# Patient Record
Sex: FEMALE | Race: WHITE | NOT HISPANIC OR LATINO | Employment: OTHER | RURAL
[De-identification: names, ages, dates, MRNs, and addresses within clinical notes are randomized per-mention and may not be internally consistent; named-entity substitution may affect disease eponyms.]

---

## 2018-04-24 ENCOUNTER — OFFICE VISIT (OUTPATIENT)
Dept: CARDIOLOGY | Facility: CLINIC | Age: 78
End: 2018-04-24

## 2018-04-24 VITALS
WEIGHT: 164 LBS | HEIGHT: 65 IN | BODY MASS INDEX: 27.32 KG/M2 | HEART RATE: 72 BPM | SYSTOLIC BLOOD PRESSURE: 173 MMHG | DIASTOLIC BLOOD PRESSURE: 88 MMHG

## 2018-04-24 DIAGNOSIS — R07.2 PRECORDIAL PAIN: Primary | ICD-10-CM

## 2018-04-24 DIAGNOSIS — R03.0 ELEVATED BP WITHOUT DIAGNOSIS OF HYPERTENSION: ICD-10-CM

## 2018-04-24 DIAGNOSIS — E11.9 TYPE 2 DIABETES MELLITUS WITHOUT COMPLICATION, WITHOUT LONG-TERM CURRENT USE OF INSULIN (HCC): ICD-10-CM

## 2018-04-24 PROCEDURE — 93000 ELECTROCARDIOGRAM COMPLETE: CPT | Performed by: INTERNAL MEDICINE

## 2018-04-24 PROCEDURE — 99204 OFFICE O/P NEW MOD 45 MIN: CPT | Performed by: INTERNAL MEDICINE

## 2018-04-24 RX ORDER — POTASSIUM CHLORIDE 20 MEQ/1
20 TABLET, EXTENDED RELEASE ORAL 2 TIMES DAILY
COMMUNITY

## 2018-04-24 RX ORDER — FUROSEMIDE 20 MG/1
20 TABLET ORAL 2 TIMES DAILY
COMMUNITY

## 2018-04-24 NOTE — PROGRESS NOTES
"[facility] at [facility]  Consultation H&P  Camryn Bender  1940  [phone]    VISIT DATE:  18    PCP: JUAN Correa  [address] [address]    IDENTIFICATION: A 77 y.o. female. Madeleine Chandra's aunt    CC:  Chief Complaint   Patient presents with   • Chest Pain   • Edema       PROBLEM LIST:  1. CP  2. Lower extremity edema  3. OA  4. HTN  5. Hx DM   1. For 10+ years \"resolved\" following gastric bypass  6.   1. Twins stillbirth, nl pregnancies otherwise  7. Surgical history   1. Lasik eye surgery  2. Appendectomy  3. Hemorrhoid surgery  4. Hysterectomy  5. Bladder tack  6. Rotator cuff surgery R  7. Gastric bypass , [place], lost 57 pounds    Allergies  Allergies   Allergen Reactions   • Penicillins Hives       Current Medications    Current Outpatient Prescriptions:   •  furosemide (LASIX) 20 MG tablet, Take 20 mg by mouth 2 (Two) Times a Day., Disp: , Rfl:   •  MAGNESIUM PO, Take  by mouth., Disp: , Rfl:   •  potassium chloride (K-DUR,KLOR-CON) 20 MEQ CR tablet, Take 20 mEq by mouth 2 (Two) Times a Day., Disp: , Rfl:      History of Present Illness   HPI  This is a 77-year-old female with the above-mentioned PMH who presents for consult from PCP JUAN Correa for cardiac evaluation of chest tightness in the setting of cardiac risk factors.    Pt reports an episode 3 weeks ago where she was dreaming that she had heavy pressure on her chest, and then woke up with the same heavy pressure that lasted around 30 mn before it resolved and she went back to sleep. She states the next day she started noticing swelling in her hands and feet and gained about 6-7 lbs seemingly overnight. She was given lasix per her PCP and this has helped her symptoms. She told her PCP about the episode of chest pain and was referred to us. She reports having some discomfort in her chest from time to time since then, feeling like \"shes about to get some indigestion\" but it " "hasn't been severe. She's been physically active building a fence over the last few weeks with no exertional symptoms. She states her energy levels have been good.    She was  5 months ago. BP was 173/88 today, she had a hectic morning going to the wrong office. Her BP was 150 systolic at her PCP. She states that her BP usually runs 140/80. IS not on any antihypertensive. She reports she had a stress test and echo pre op for screening remotely and everything was wnl, data deficient.     Pt denies any dyspnea at rest, dyspnea on exertion, orthopnea, PND, palpitations, or claudication. Pt denies history of CHF, DVT, PE, MI, CVA, TIA, or rheumatic fever.       ROS  Review of Systems   Cardiovascular: Positive for chest pain and leg swelling.   Musculoskeletal: Positive for arthritis.   All other systems reviewed and are negative.      SOCIAL HX  Social History     Social History   • Marital status:      Spouse name: N/A   • Number of children: N/A   • Years of education: N/A     Occupational History   • Not on file.     Social History Main Topics   • Smoking status: Former Smoker   • Smokeless tobacco: Never Used   • Alcohol use No   • Drug use: No   • Sexual activity: Defer     Other Topics Concern   • Not on file     Social History Narrative   • No narrative on file       FAMILY HX  Family History   Problem Relation Age of Onset   • Angina Mother    • Heart disease Sister    • Heart attack Brother        Vitals:    04/24/18 1024   BP: 173/88   BP Location: Left arm   Patient Position: Sitting   Pulse: 72   Weight: 74.4 kg (164 lb)   Height: 165.1 cm (65\")   BP upon recheck: 160/85    PHYSICAL EXAMINATION:  Physical Exam   Constitutional: She is oriented to person, place, and time. She appears well-developed and well-nourished. No distress.   HENT:   Head: Normocephalic and atraumatic.   Right Ear: External ear normal.   Left Ear: External ear normal.   Nose: Nose normal.   Eyes: Conjunctivae and EOM are " normal.   Neck: Neck supple. No hepatojugular reflux and no JVD present. Carotid bruit is not present. No thyromegaly present.   Cardiovascular: Normal rate, regular rhythm, S1 normal, S2 normal, normal heart sounds, intact distal pulses and normal pulses.  Exam reveals no gallop, no distant heart sounds and no midsystolic click.    No murmur heard.  Pulses:       Radial pulses are 2+ on the right side, and 2+ on the left side.        Dorsalis pedis pulses are 2+ on the right side, and 2+ on the left side.        Posterior tibial pulses are 2+ on the right side, and 2+ on the left side.   Pulmonary/Chest: Effort normal and breath sounds normal. No respiratory distress. She has no decreased breath sounds. She has no wheezes. She has no rhonchi. She has no rales.   Abdominal: Soft. Bowel sounds are normal. There is no hepatosplenomegaly. There is no tenderness.   Musculoskeletal: Normal range of motion. She exhibits edema (trace).   Neurological: She is alert and oriented to person, place, and time.   No focal deficits.   Skin: Skin is warm and dry. No erythema.   Psychiatric: She has a normal mood and affect. Thought content normal.   Nursing note and vitals reviewed.      Diagnostic Data:    ECG 12 Lead  Date/Time: 4/24/2018 10:39 AM  Performed by: JOSE TORRES  Authorized by: JOSE TORRES   Rhythm: sinus rhythm  BPM: 68  Clinical impression: normal ECG           ASSESSMENT:   Diagnosis Plan   1. Precordial pain  ECG 12 Lead    Stress Test With Myocardial Perfusion (1 Day)    Lipid Panel    Hemoglobin A1c    Comprehensive Metabolic Panel    CBC (No Diff)   2. Elevated BP without diagnosis of hypertension  Lipid Panel    Hemoglobin A1c    Comprehensive Metabolic Panel    CBC (No Diff)   3. Type 2 diabetes mellitus without complication, without long-term current use of insulin  Hemoglobin A1c       PLAN:  1. W CP in setting of likely under treated risk factors of DM, HTN, and unknown lipid status, we will treat  as anginal equivalent and risk stratify with noninvasive ischemic eval.  2. Will check labs on same day as the test.    RTC 6-9 months, sooner if needed    Scribed for Manish Valentino MD by Chely Avina PA-C. 4/24/2018  11:24 AM  I, Manish Valentino MD, personally performed the services described in this documentation as scribed by the above named individual in my presence, and it is both accurate and complete.  4/24/2018  11:24 AM    Manish Valentino MD, FACC

## 2018-05-15 ENCOUNTER — HOSPITAL ENCOUNTER (OUTPATIENT)
Dept: CARDIOLOGY | Facility: HOSPITAL | Age: 78
Discharge: HOME | End: 2018-05-15

## 2018-05-15 DIAGNOSIS — R07.2 PRECORDIAL PAIN: ICD-10-CM

## 2018-05-15 PROCEDURE — A9500 TC99M SESTAMIBI: HCPCS | Performed by: INTERNAL MEDICINE

## 2018-05-15 PROCEDURE — 93017 CV STRESS TEST TRACING ONLY: CPT

## 2018-05-15 PROCEDURE — 78452 HT MUSCLE IMAGE SPECT MULT: CPT | Performed by: INTERNAL MEDICINE

## 2018-05-15 PROCEDURE — 78452 HT MUSCLE IMAGE SPECT MULT: CPT

## 2018-05-15 PROCEDURE — 0 TECHNETIUM SESTAMIBI: Performed by: INTERNAL MEDICINE

## 2018-05-15 PROCEDURE — 93018 CV STRESS TEST I&R ONLY: CPT | Performed by: INTERNAL MEDICINE

## 2018-05-15 RX ORDER — DIPHENHYDRAMINE HCL 25 MG
25 CAPSULE ORAL EVERY 6 HOURS PRN
COMMUNITY

## 2018-05-15 RX ADMIN — TECHNETIUM TC 99M SESTAMIBI 1 DOSE: 1 INJECTION INTRAVENOUS at 13:05

## 2018-05-15 RX ADMIN — TECHNETIUM TC 99M SESTAMIBI 1 DOSE: 1 INJECTION INTRAVENOUS at 11:05

## 2018-05-16 LAB
BH CV STRESS BP STAGE 1: NORMAL
BH CV STRESS DURATION MIN STAGE 1: 3
BH CV STRESS DURATION MIN STAGE 2: 2
BH CV STRESS DURATION SEC STAGE 1: 0
BH CV STRESS DURATION SEC STAGE 2: 0
BH CV STRESS GRADE STAGE 1: 10
BH CV STRESS GRADE STAGE 2: 12
BH CV STRESS HR STAGE 1: 131
BH CV STRESS HR STAGE 2: 123
BH CV STRESS METS STAGE 1: 5
BH CV STRESS METS STAGE 2: 7.5
BH CV STRESS PROTOCOL 1: NORMAL
BH CV STRESS RECOVERY BP: NORMAL MMHG
BH CV STRESS RECOVERY HR: 81 BPM
BH CV STRESS SPEED STAGE 1: 1.7
BH CV STRESS SPEED STAGE 2: 2.5
BH CV STRESS STAGE 1: 1
BH CV STRESS STAGE 2: 2
LV EF NUC BP: 62 %
MAXIMAL PREDICTED HEART RATE: 142 BPM
PERCENT MAX PREDICTED HR: 96.48 %
STRESS BASELINE BP: NORMAL MMHG
STRESS BASELINE HR: 67 BPM
STRESS PERCENT HR: 114 %
STRESS POST ESTIMATED WORKLOAD: 5.1 METS
STRESS POST EXERCISE DUR MIN: 5 MIN
STRESS POST EXERCISE DUR SEC: 0 SEC
STRESS POST PEAK BP: NORMAL MMHG
STRESS POST PEAK HR: 137 BPM
STRESS TARGET HR: 121 BPM

## 2018-05-18 ENCOUNTER — TELEPHONE (OUTPATIENT)
Dept: CARDIOLOGY | Facility: CLINIC | Age: 78
End: 2018-05-18

## 2018-09-14 ENCOUNTER — HOSPITAL ENCOUNTER (OUTPATIENT)
Age: 78
End: 2018-09-14
Payer: MEDICARE

## 2018-09-14 DIAGNOSIS — S52.501A: Primary | ICD-10-CM

## 2018-09-14 PROCEDURE — 73200 CT UPPER EXTREMITY W/O DYE: CPT

## 2018-10-23 ENCOUNTER — HOSPITAL ENCOUNTER (OUTPATIENT)
Age: 78
End: 2018-10-23
Payer: MEDICARE

## 2018-10-23 ENCOUNTER — HOSPITAL ENCOUNTER (OUTPATIENT)
Dept: HOSPITAL 22 - OT | Age: 78
Discharge: HOME | End: 2018-10-23
Payer: MEDICARE

## 2018-10-23 DIAGNOSIS — S52.501A: Primary | ICD-10-CM

## 2018-10-23 PROCEDURE — 97763 ORTHC/PROSTC MGMT SBSQ ENC: CPT

## 2018-10-23 PROCEDURE — 73110 X-RAY EXAM OF WRIST: CPT

## 2020-06-09 ENCOUNTER — HOSPITAL ENCOUNTER (OUTPATIENT)
Age: 80
End: 2020-06-09
Payer: MEDICARE

## 2020-06-09 DIAGNOSIS — M25.571: Primary | ICD-10-CM

## 2020-06-09 PROCEDURE — 73721 MRI JNT OF LWR EXTRE W/O DYE: CPT

## 2021-12-21 ENCOUNTER — HOSPITAL ENCOUNTER (OUTPATIENT)
Age: 81
End: 2021-12-21
Payer: MEDICARE

## 2021-12-21 DIAGNOSIS — R42: Primary | ICD-10-CM

## 2021-12-21 DIAGNOSIS — Z79.899: ICD-10-CM

## 2021-12-21 LAB
HCT VFR BLD CALC: 41.6 % (ref 37–47)
HGB BLD-MCNC: 14.3 G/DL (ref 12.2–16.2)
MCHC RBC-ENTMCNC: 34.5 G/DL (ref 31.8–35.4)
MCV RBC: 91 FL (ref 81–99)
MEAN CORPUSCULAR HEMOGLOBIN: 31.4 PG (ref 27–31.2)
PLATELET # BLD: 201 K/MM3 (ref 142–424)
RBC # BLD AUTO: 4.57 M/MM3 (ref 4.2–5.4)
TROPONIN I: < 0.01 NG/ML (ref 0–0.03)
TSH SERPL-ACNC: 1.58 UIU/ML (ref 0.47–4.68)
WBC # BLD AUTO: 4.6 K/MM3 (ref 4.8–10.8)

## 2021-12-21 PROCEDURE — 84484 ASSAY OF TROPONIN QUANT: CPT

## 2021-12-21 PROCEDURE — 85025 COMPLETE CBC W/AUTO DIFF WBC: CPT

## 2021-12-21 PROCEDURE — 84443 ASSAY THYROID STIM HORMONE: CPT

## 2021-12-21 PROCEDURE — 36415 COLL VENOUS BLD VENIPUNCTURE: CPT

## 2022-01-31 ENCOUNTER — HOSPITAL ENCOUNTER (OUTPATIENT)
Age: 82
End: 2022-01-31
Payer: MEDICARE

## 2022-01-31 DIAGNOSIS — Z01.812: ICD-10-CM

## 2022-01-31 DIAGNOSIS — R00.2: Primary | ICD-10-CM

## 2022-01-31 DIAGNOSIS — Z11.52: ICD-10-CM

## 2022-01-31 DIAGNOSIS — R07.9: ICD-10-CM

## 2022-01-31 DIAGNOSIS — Z87.891: ICD-10-CM

## 2022-01-31 DIAGNOSIS — R94.31: ICD-10-CM

## 2022-01-31 DIAGNOSIS — R68.83: ICD-10-CM

## 2022-01-31 DIAGNOSIS — R42: ICD-10-CM

## 2022-01-31 DIAGNOSIS — R55: ICD-10-CM

## 2022-01-31 LAB
AMYLASE SERPL-CCNC: 41 U/L (ref 30–110)
ANION GAP SERPL CALC-SCNC: 13.1 MEQ/L (ref 5–15)
BACTERIA URNS QL MICRO: (no result) /LPF
BUN SERPL-MCNC: 24 MG/DL (ref 7–17)
CALCIUM SPEC-MCNC: 9.2 MG/DL (ref 8.4–10.2)
CHLORIDE SPEC-SCNC: 105 MMOL/L (ref 98–107)
CO2 SERPL-SCNC: 27 MMOL/L (ref 22–30)
COLOR UR: YELLOW
CREAT BLD-SCNC: 1.1 MG/DL (ref 0.52–1.04)
ESTIMATED GLOMERULAR FILT RATE: 48 ML/MIN (ref 60–?)
GFR (AFRICAN AMERICAN): 58 ML/MIN (ref 60–?)
GLUCOSE: 125 MG/DL (ref 74–100)
HCT VFR BLD CALC: 40.8 % (ref 37–47)
HGB BLD-MCNC: 13.2 G/DL (ref 12.2–16.2)
LEUKOCYTE ESTERASE UR QL STRIP: (no result)
LIPASE: 25 U/L (ref 23–300)
MCHC RBC-ENTMCNC: 32.4 G/DL (ref 31.8–35.4)
MCV RBC: 93.1 FL (ref 81–99)
MEAN CORPUSCULAR HEMOGLOBIN: 30.2 PG (ref 27–31.2)
MICRO URNS: (no result)
PH UR: 6 [PH] (ref 5–8.5)
PLATELET # BLD: 289 K/MM3 (ref 142–424)
POTASSIUM: 4.1 MMOL/L (ref 3.5–5.1)
RBC # BLD AUTO: 4.38 M/MM3 (ref 4.2–5.4)
RBC #/AREA URNS HPF: (no result) #/HPF (ref 0–3)
SODIUM SPEC-SCNC: 141 MMOL/L (ref 136–145)
SP GR UR: 1.01 (ref 1–1.03)
SQUAMOUS URNS QL MICRO: (no result) #/HPF (ref 0–5)
UROBILINOGEN UR QL: 0.2 EU/DL
WBC # BLD AUTO: 6.5 K/MM3 (ref 4.8–10.8)

## 2022-01-31 PROCEDURE — 93225 XTRNL ECG REC<48 HRS REC: CPT

## 2022-01-31 PROCEDURE — 87186 SC STD MICRODIL/AGAR DIL: CPT

## 2022-01-31 PROCEDURE — 82150 ASSAY OF AMYLASE: CPT

## 2022-01-31 PROCEDURE — 85025 COMPLETE CBC W/AUTO DIFF WBC: CPT

## 2022-01-31 PROCEDURE — U0003 INFECTIOUS AGENT DETECTION BY NUCLEIC ACID (DNA OR RNA); SEVERE ACUTE RESPIRATORY SYNDROME CORONAVIRUS 2 (SARS-COV-2) (CORONAVIRUS DISEASE [COVID-19]), AMPLIFIED PROBE TECHNIQUE, MAKING USE OF HIGH THROUGHPUT TECHNOLOGIES AS DESCRIBED BY CMS-2020-01-R: HCPCS

## 2022-01-31 PROCEDURE — 81001 URINALYSIS AUTO W/SCOPE: CPT

## 2022-01-31 PROCEDURE — U0005 INFEC AGEN DETEC AMPLI PROBE: HCPCS

## 2022-01-31 PROCEDURE — C9803 HOPD COVID-19 SPEC COLLECT: HCPCS

## 2022-01-31 PROCEDURE — 87088 URINE BACTERIA CULTURE: CPT

## 2022-01-31 PROCEDURE — 80048 BASIC METABOLIC PNL TOTAL CA: CPT

## 2022-01-31 PROCEDURE — 87086 URINE CULTURE/COLONY COUNT: CPT

## 2022-01-31 PROCEDURE — 82652 VIT D 1 25-DIHYDROXY: CPT

## 2022-01-31 PROCEDURE — 83690 ASSAY OF LIPASE: CPT

## 2022-02-01 ENCOUNTER — HOSPITAL ENCOUNTER (OUTPATIENT)
Dept: HOSPITAL 22 - CATHLAB | Age: 82
Discharge: HOME | End: 2022-02-01
Payer: MEDICARE

## 2022-02-01 VITALS
SYSTOLIC BLOOD PRESSURE: 118 MMHG | DIASTOLIC BLOOD PRESSURE: 58 MMHG | OXYGEN SATURATION: 96 % | HEART RATE: 52 BPM | RESPIRATION RATE: 16 BRPM

## 2022-02-01 VITALS
OXYGEN SATURATION: 97 % | SYSTOLIC BLOOD PRESSURE: 118 MMHG | DIASTOLIC BLOOD PRESSURE: 58 MMHG | HEART RATE: 46 BPM | RESPIRATION RATE: 16 BRPM

## 2022-02-01 VITALS — BODY MASS INDEX: 25 KG/M2

## 2022-02-01 VITALS — HEART RATE: 50 BPM | SYSTOLIC BLOOD PRESSURE: 102 MMHG | DIASTOLIC BLOOD PRESSURE: 57 MMHG

## 2022-02-01 VITALS
RESPIRATION RATE: 16 BRPM | DIASTOLIC BLOOD PRESSURE: 58 MMHG | SYSTOLIC BLOOD PRESSURE: 124 MMHG | HEART RATE: 55 BPM | OXYGEN SATURATION: 96 %

## 2022-02-01 VITALS
OXYGEN SATURATION: 96 % | HEART RATE: 51 BPM | RESPIRATION RATE: 16 BRPM | DIASTOLIC BLOOD PRESSURE: 58 MMHG | SYSTOLIC BLOOD PRESSURE: 120 MMHG

## 2022-02-01 VITALS
SYSTOLIC BLOOD PRESSURE: 123 MMHG | RESPIRATION RATE: 16 BRPM | DIASTOLIC BLOOD PRESSURE: 62 MMHG | HEART RATE: 52 BPM | OXYGEN SATURATION: 95 %

## 2022-02-01 VITALS — HEART RATE: 60 BPM | DIASTOLIC BLOOD PRESSURE: 68 MMHG | SYSTOLIC BLOOD PRESSURE: 140 MMHG

## 2022-02-01 VITALS
OXYGEN SATURATION: 97 % | HEART RATE: 50 BPM | DIASTOLIC BLOOD PRESSURE: 62 MMHG | SYSTOLIC BLOOD PRESSURE: 124 MMHG | RESPIRATION RATE: 16 BRPM

## 2022-02-01 VITALS
DIASTOLIC BLOOD PRESSURE: 80 MMHG | SYSTOLIC BLOOD PRESSURE: 164 MMHG | HEART RATE: 70 BPM | OXYGEN SATURATION: 96 % | TEMPERATURE: 98.42 F | RESPIRATION RATE: 20 BRPM

## 2022-02-01 VITALS
RESPIRATION RATE: 20 BRPM | OXYGEN SATURATION: 96 % | SYSTOLIC BLOOD PRESSURE: 139 MMHG | DIASTOLIC BLOOD PRESSURE: 68 MMHG | HEART RATE: 61 BPM

## 2022-02-01 DIAGNOSIS — E11.9: ICD-10-CM

## 2022-02-01 DIAGNOSIS — R07.9: ICD-10-CM

## 2022-02-01 DIAGNOSIS — Z87.891: ICD-10-CM

## 2022-02-01 DIAGNOSIS — R42: ICD-10-CM

## 2022-02-01 DIAGNOSIS — R00.2: Primary | ICD-10-CM

## 2022-02-01 DIAGNOSIS — R94.31: ICD-10-CM

## 2022-02-01 DIAGNOSIS — I25.10: ICD-10-CM

## 2022-02-01 DIAGNOSIS — R55: ICD-10-CM

## 2022-02-01 LAB — CATHL ACTIVATED CLOTTING TIME: 272 SEC (ref 74–125)

## 2022-02-01 PROCEDURE — 92928 PRQ TCAT PLMT NTRAC ST 1 LES: CPT

## 2022-02-01 PROCEDURE — 99153 MOD SED SAME PHYS/QHP EA: CPT

## 2022-02-01 PROCEDURE — 93306 TTE W/DOPPLER COMPLETE: CPT

## 2022-02-01 PROCEDURE — C1876 STENT, NON-COA/NON-COV W/DEL: HCPCS

## 2022-02-01 PROCEDURE — C1769 GUIDE WIRE: HCPCS

## 2022-02-01 PROCEDURE — 93458 L HRT ARTERY/VENTRICLE ANGIO: CPT

## 2022-02-01 PROCEDURE — C9600 PERC DRUG-EL COR STENT SING: HCPCS

## 2022-02-01 PROCEDURE — 93571 IV DOP VEL&/PRESS C FLO 1ST: CPT

## 2022-02-01 PROCEDURE — 99152 MOD SED SAME PHYS/QHP 5/>YRS: CPT

## 2022-02-01 PROCEDURE — 85347 COAGULATION TIME ACTIVATED: CPT

## 2022-02-01 PROCEDURE — C1725 CATH, TRANSLUMIN NON-LASER: HCPCS

## 2022-02-09 ENCOUNTER — HOSPITAL ENCOUNTER (OUTPATIENT)
Age: 82
End: 2022-02-09
Payer: MEDICARE

## 2022-02-09 ENCOUNTER — HOSPITAL ENCOUNTER (OUTPATIENT)
Dept: HOSPITAL 22 - CATHLAB | Age: 82
Discharge: HOME | End: 2022-02-09
Payer: MEDICARE

## 2022-02-09 VITALS
SYSTOLIC BLOOD PRESSURE: 160 MMHG | DIASTOLIC BLOOD PRESSURE: 72 MMHG | HEART RATE: 60 BPM | OXYGEN SATURATION: 98 % | RESPIRATION RATE: 19 BRPM

## 2022-02-09 VITALS — BODY MASS INDEX: 24.7 KG/M2

## 2022-02-09 VITALS
HEART RATE: 60 BPM | OXYGEN SATURATION: 98 % | RESPIRATION RATE: 19 BRPM | DIASTOLIC BLOOD PRESSURE: 65 MMHG | SYSTOLIC BLOOD PRESSURE: 156 MMHG

## 2022-02-09 VITALS
RESPIRATION RATE: 19 BRPM | HEART RATE: 62 BPM | SYSTOLIC BLOOD PRESSURE: 151 MMHG | OXYGEN SATURATION: 99 % | DIASTOLIC BLOOD PRESSURE: 76 MMHG

## 2022-02-09 DIAGNOSIS — R00.2: ICD-10-CM

## 2022-02-09 DIAGNOSIS — R07.9: ICD-10-CM

## 2022-02-09 DIAGNOSIS — R55: Primary | ICD-10-CM

## 2022-02-09 DIAGNOSIS — I25.10: Primary | ICD-10-CM

## 2022-02-09 DIAGNOSIS — Z95.5: ICD-10-CM

## 2022-02-09 DIAGNOSIS — R55: ICD-10-CM

## 2022-02-09 DIAGNOSIS — Z20.822: ICD-10-CM

## 2022-02-09 DIAGNOSIS — I25.10: ICD-10-CM

## 2022-02-09 DIAGNOSIS — Z87.891: ICD-10-CM

## 2022-02-09 DIAGNOSIS — R42: ICD-10-CM

## 2022-02-09 DIAGNOSIS — R94.31: ICD-10-CM

## 2022-02-09 DIAGNOSIS — I51.89: ICD-10-CM

## 2022-02-09 DIAGNOSIS — R00.1: ICD-10-CM

## 2022-02-09 PROCEDURE — 33285 INSJ SUBQ CAR RHYTHM MNTR: CPT

## 2022-02-09 PROCEDURE — U0003 INFECTIOUS AGENT DETECTION BY NUCLEIC ACID (DNA OR RNA); SEVERE ACUTE RESPIRATORY SYNDROME CORONAVIRUS 2 (SARS-COV-2) (CORONAVIRUS DISEASE [COVID-19]), AMPLIFIED PROBE TECHNIQUE, MAKING USE OF HIGH THROUGHPUT TECHNOLOGIES AS DESCRIBED BY CMS-2020-01-R: HCPCS

## 2022-02-09 PROCEDURE — U0005 INFEC AGEN DETEC AMPLI PROBE: HCPCS

## 2022-02-09 PROCEDURE — C9803 HOPD COVID-19 SPEC COLLECT: HCPCS

## 2022-02-11 LAB
1,25-DIHYDROXY, VITAMIN D-2: <10 PG/ML
1,25-DIHYDROXY, VITAMIN D-3: 42 PG/ML
VIT D25+D1,25 OH+D1,25 PNL SERPL-MCNC: 43 PG/ML

## 2022-02-15 ENCOUNTER — HOSPITAL ENCOUNTER (OUTPATIENT)
Age: 82
End: 2022-02-15
Payer: MEDICARE

## 2022-02-15 DIAGNOSIS — Z87.891: ICD-10-CM

## 2022-02-15 DIAGNOSIS — R07.9: ICD-10-CM

## 2022-02-15 DIAGNOSIS — R94.31: ICD-10-CM

## 2022-02-15 DIAGNOSIS — R00.2: ICD-10-CM

## 2022-02-15 DIAGNOSIS — R42: ICD-10-CM

## 2022-02-15 DIAGNOSIS — I25.10: Primary | ICD-10-CM

## 2022-02-15 DIAGNOSIS — R55: ICD-10-CM

## 2022-02-15 PROCEDURE — 70470 CT HEAD/BRAIN W/O & W/DYE: CPT

## 2022-02-23 ENCOUNTER — HOSPITAL ENCOUNTER (OUTPATIENT)
Dept: HOSPITAL 22 - PT | Age: 82
LOS: 34 days | Discharge: HOME | End: 2022-03-29
Payer: MEDICARE

## 2022-02-23 DIAGNOSIS — Z95.5: ICD-10-CM

## 2022-02-23 DIAGNOSIS — I25.10: Primary | ICD-10-CM

## 2022-03-07 ENCOUNTER — HOSPITAL ENCOUNTER (OUTPATIENT)
Age: 82
End: 2022-03-07
Payer: MEDICARE

## 2022-03-07 DIAGNOSIS — Z87.891: ICD-10-CM

## 2022-03-07 DIAGNOSIS — I48.0: ICD-10-CM

## 2022-03-07 DIAGNOSIS — I45.10: ICD-10-CM

## 2022-03-07 DIAGNOSIS — R94.31: ICD-10-CM

## 2022-03-07 DIAGNOSIS — Z95.818: ICD-10-CM

## 2022-03-07 DIAGNOSIS — I25.10: Primary | ICD-10-CM

## 2022-03-07 LAB
ANION GAP SERPL CALC-SCNC: 13.2 MEQ/L (ref 5–15)
BILIRUB UR STRIP-MCNC: (no result) MG/DL
BUN SERPL-MCNC: 23 MG/DL (ref 7–17)
CALCIUM SPEC-MCNC: 9 MG/DL (ref 8.4–10.2)
CELLS COUNTED: 100
CHLORIDE SPEC-SCNC: 100 MMOL/L (ref 98–107)
CO2 SERPL-SCNC: 25 MMOL/L (ref 22–30)
COLOR UR: YELLOW
CREAT BLD-SCNC: 1.1 MG/DL (ref 0.52–1.04)
ESTIMATED GLOMERULAR FILT RATE: 48 ML/MIN (ref 60–?)
GFR (AFRICAN AMERICAN): 58 ML/MIN (ref 60–?)
GLUCOSE: 182 MG/DL (ref 74–100)
HCT VFR BLD CALC: 36.9 % (ref 37–47)
HGB BLD-MCNC: 11.9 G/DL (ref 12.2–16.2)
KETONES UR STRIP.AUTO-MCNC: (no result) MG/DL
LEUKOCYTE ESTERASE UR QL STRIP: (no result)
MANUAL DIFFERENTIAL: (no result)
MCHC RBC-ENTMCNC: 32.2 G/DL (ref 31.8–35.4)
MCV RBC: 95.4 FL (ref 81–99)
MEAN CORPUSCULAR HEMOGLOBIN: 30.7 PG (ref 27–31.2)
MICRO URNS: (no result)
PH UR: 6 [PH] (ref 5–8.5)
PLATELET # BLD: 310 K/MM3 (ref 142–424)
POTASSIUM: 4.2 MMOL/L (ref 3.5–5.1)
PROT UR STRIP-MCNC: (no result) MG/DL
RBC # BLD AUTO: 3.87 M/MM3 (ref 4.2–5.4)
RBC #/AREA URNS HPF: (no result) #/HPF (ref 0–3)
SODIUM SPEC-SCNC: 134 MMOL/L (ref 136–145)
SP GR UR: 1.02 (ref 1–1.03)
SQUAMOUS URNS QL MICRO: (no result) #/HPF (ref 0–5)
UROBILINOGEN UR QL: 0.2 EU/DL
WBC # BLD AUTO: 14.1 K/MM3 (ref 4.8–10.8)
WBC # UR: (no result) #/HPF (ref 0–3)

## 2022-03-07 PROCEDURE — 85007 BL SMEAR W/DIFF WBC COUNT: CPT

## 2022-03-07 PROCEDURE — 80048 BASIC METABOLIC PNL TOTAL CA: CPT

## 2022-03-07 PROCEDURE — 81001 URINALYSIS AUTO W/SCOPE: CPT

## 2022-03-07 PROCEDURE — 36415 COLL VENOUS BLD VENIPUNCTURE: CPT

## 2022-03-07 PROCEDURE — 85025 COMPLETE CBC W/AUTO DIFF WBC: CPT

## 2022-05-18 ENCOUNTER — HOSPITAL ENCOUNTER (OUTPATIENT)
Age: 82
End: 2022-05-18
Payer: MEDICARE

## 2022-05-18 DIAGNOSIS — I25.10: Primary | ICD-10-CM

## 2022-05-18 DIAGNOSIS — I48.0: ICD-10-CM

## 2022-05-18 DIAGNOSIS — R94.31: ICD-10-CM

## 2022-05-18 DIAGNOSIS — I45.10: ICD-10-CM

## 2022-05-18 DIAGNOSIS — Z87.891: ICD-10-CM

## 2022-05-18 DIAGNOSIS — Z95.818: ICD-10-CM

## 2022-05-18 LAB
ALBUMIN LEVEL: 3.9 G/DL (ref 3.5–5)
ALP ISO SERPL-ACNC: 95 U/L (ref 38–126)
ALT SERPLBLD-CCNC: 24 U/L (ref 12–78)
AST SERPL QL: 28 U/L (ref 14–36)
BILIRUB DIRECT SERPL-MCNC: 0.1 MG/DL (ref 0–0.4)
BILIRUB INDIRECT SERPL-MCNC: 0.7 MG/DL (ref 0–0.9)
BILIRUB INDIRECT SERPL-MCNC: 0.7 MG/DL (ref 0–1.1)
BILIRUBIN,TOTAL: 0.8 MG/DL (ref 0.2–1.3)
CHOLEST SPEC-SCNC: 109 MG/DL (ref 140–200)
CK SERPL-CCNC: 97 U/L (ref 30–135)
HDLC SERPL-MCNC: 64 MG/DL (ref 40–60)
PROT SERPL-MCNC: 6.7 G/DL (ref 6.3–8.2)
TRIGLYCERIDES: 80 MG/DL (ref 30–150)

## 2022-05-18 PROCEDURE — 80061 LIPID PANEL: CPT

## 2022-05-18 PROCEDURE — 82550 ASSAY OF CK (CPK): CPT

## 2022-05-18 PROCEDURE — 36415 COLL VENOUS BLD VENIPUNCTURE: CPT

## 2022-05-18 PROCEDURE — 80076 HEPATIC FUNCTION PANEL: CPT

## 2022-10-24 ENCOUNTER — HOSPITAL ENCOUNTER (OUTPATIENT)
Age: 82
End: 2022-10-24
Payer: MEDICARE

## 2022-10-24 DIAGNOSIS — G47.00: ICD-10-CM

## 2022-10-24 DIAGNOSIS — Z87.440: ICD-10-CM

## 2022-10-24 DIAGNOSIS — I25.10: Primary | ICD-10-CM

## 2022-10-24 DIAGNOSIS — R82.90: ICD-10-CM

## 2022-10-24 LAB
ALBUMIN LEVEL: 4 G/DL (ref 3.5–5)
ALBUMIN/GLOB SERPL: 1.4 {RATIO} (ref 1.1–1.8)
ALP ISO SERPL-ACNC: 145 U/L (ref 38–126)
ALT SERPLBLD-CCNC: 26 U/L (ref 12–78)
ANION GAP SERPL CALC-SCNC: 14.2 MEQ/L (ref 5–15)
AST SERPL QL: 34 U/L (ref 14–36)
BILIRUBIN,TOTAL: 0.6 MG/DL (ref 0.2–1.3)
BUN SERPL-MCNC: 21 MG/DL (ref 7–17)
CALCIUM SPEC-MCNC: 9.3 MG/DL (ref 8.4–10.2)
CHLORIDE SPEC-SCNC: 106 MMOL/L (ref 98–107)
CHOLEST SPEC-SCNC: 160 MG/DL (ref 140–200)
CO2 SERPL-SCNC: 26 MMOL/L (ref 22–30)
CREAT BLD-SCNC: 0.8 MG/DL (ref 0.52–1.04)
ESTIMATED GLOMERULAR FILT RATE: 69 ML/MIN (ref 60–?)
GFR (AFRICAN AMERICAN): 83 ML/MIN (ref 60–?)
GLOBULIN SER CALC-MCNC: 2.8 G/DL (ref 1.3–3.2)
GLUCOSE: 127 MG/DL (ref 74–100)
HCT VFR BLD CALC: 41.2 % (ref 37–47)
HDLC SERPL-MCNC: 64 MG/DL (ref 40–60)
HGB BLD-MCNC: 13 G/DL (ref 12.2–16.2)
MCHC RBC-ENTMCNC: 31.6 G/DL (ref 31.8–35.4)
MCV RBC: 93.1 FL (ref 81–99)
MEAN CORPUSCULAR HEMOGLOBIN: 29.4 PG (ref 27–31.2)
PLATELET # BLD: 283 K/MM3 (ref 142–424)
POTASSIUM: 4.2 MMOL/L (ref 3.5–5.1)
PROT SERPL-MCNC: 6.8 G/DL (ref 6.3–8.2)
RBC # BLD AUTO: 4.42 M/MM3 (ref 4.2–5.4)
SODIUM SPEC-SCNC: 142 MMOL/L (ref 136–145)
TRIGLYCERIDES: 142 MG/DL (ref 30–150)
TSH SERPL-ACNC: 1.91 UIU/ML (ref 0.47–4.68)
WBC # BLD AUTO: 6.1 K/MM3 (ref 4.8–10.8)

## 2022-10-24 PROCEDURE — 80061 LIPID PANEL: CPT

## 2022-10-24 PROCEDURE — 80053 COMPREHEN METABOLIC PANEL: CPT

## 2022-10-24 PROCEDURE — 87186 SC STD MICRODIL/AGAR DIL: CPT

## 2022-10-24 PROCEDURE — 87088 URINE BACTERIA CULTURE: CPT

## 2022-10-24 PROCEDURE — 36415 COLL VENOUS BLD VENIPUNCTURE: CPT

## 2022-10-24 PROCEDURE — 85025 COMPLETE CBC W/AUTO DIFF WBC: CPT

## 2022-10-24 PROCEDURE — 87086 URINE CULTURE/COLONY COUNT: CPT

## 2022-10-24 PROCEDURE — 84443 ASSAY THYROID STIM HORMONE: CPT

## 2022-11-07 ENCOUNTER — HOSPITAL ENCOUNTER (OUTPATIENT)
Age: 82
End: 2022-11-07
Payer: MEDICARE

## 2022-11-07 DIAGNOSIS — R74.8: Primary | ICD-10-CM

## 2022-11-07 PROCEDURE — 84080 ASSAY ALKALINE PHOSPHATASES: CPT

## 2022-11-07 PROCEDURE — 80053 COMPREHEN METABOLIC PANEL: CPT

## 2022-11-07 PROCEDURE — 83036 HEMOGLOBIN GLYCOSYLATED A1C: CPT

## 2022-11-07 PROCEDURE — 84075 ASSAY ALKALINE PHOSPHATASE: CPT

## 2022-11-07 PROCEDURE — 87086 URINE CULTURE/COLONY COUNT: CPT

## 2023-03-06 ENCOUNTER — HOSPITAL ENCOUNTER (OUTPATIENT)
Age: 83
End: 2023-03-06
Payer: MEDICARE

## 2023-03-06 DIAGNOSIS — R74.8: ICD-10-CM

## 2023-03-06 DIAGNOSIS — R73.01: Primary | ICD-10-CM

## 2023-03-06 LAB
ALBUMIN LEVEL: 4.1 G/DL (ref 3.5–5)
ALBUMIN/GLOB SERPL: 1.5 {RATIO} (ref 1.1–1.8)
ALP ISO SERPL-ACNC: 113 U/L (ref 38–126)
ALT SERPLBLD-CCNC: 28 U/L (ref 12–78)
ANION GAP SERPL CALC-SCNC: 10.7 MEQ/L (ref 5–15)
AST SERPL QL: 27 U/L (ref 14–36)
BILIRUBIN,TOTAL: 0.5 MG/DL (ref 0.2–1.3)
BUN SERPL-MCNC: 23 MG/DL (ref 7–17)
CALCIUM SPEC-MCNC: 9.2 MG/DL (ref 8.4–10.2)
CHLORIDE SPEC-SCNC: 107 MMOL/L (ref 98–107)
CO2 SERPL-SCNC: 26 MMOL/L (ref 22–30)
CREAT BLD-SCNC: 1 MG/DL (ref 0.52–1.04)
ESTIMATED GLOMERULAR FILT RATE: 53 ML/MIN (ref 60–?)
GFR (AFRICAN AMERICAN): 64 ML/MIN (ref 60–?)
GLOBULIN SER CALC-MCNC: 2.8 G/DL (ref 1.3–3.2)
GLUCOSE: 138 MG/DL (ref 74–100)
HBA1C MFR BLD: 6.4 % (ref 4–6)
POTASSIUM: 4.7 MMOL/L (ref 3.5–5.1)
PROT SERPL-MCNC: 6.9 G/DL (ref 6.3–8.2)
SODIUM SPEC-SCNC: 139 MMOL/L (ref 136–145)

## 2023-03-06 PROCEDURE — 82043 UR ALBUMIN QUANTITATIVE: CPT

## 2023-03-06 PROCEDURE — 83036 HEMOGLOBIN GLYCOSYLATED A1C: CPT

## 2023-03-06 PROCEDURE — 36415 COLL VENOUS BLD VENIPUNCTURE: CPT

## 2023-03-06 PROCEDURE — 82570 ASSAY OF URINE CREATININE: CPT

## 2023-03-06 PROCEDURE — 80053 COMPREHEN METABOLIC PANEL: CPT

## 2023-06-05 ENCOUNTER — HOSPITAL ENCOUNTER (OUTPATIENT)
Age: 83
End: 2023-06-05
Payer: MEDICARE

## 2023-06-05 DIAGNOSIS — E11.9: Primary | ICD-10-CM

## 2023-06-05 DIAGNOSIS — I25.10: ICD-10-CM

## 2023-06-05 LAB
ALBUMIN LEVEL: 3.8 G/DL (ref 3.5–5)
ALBUMIN/GLOB SERPL: 1.5 {RATIO} (ref 1.1–1.8)
ALP ISO SERPL-ACNC: 113 U/L (ref 38–126)
ALT SERPLBLD-CCNC: 31 U/L (ref 12–78)
ANION GAP SERPL CALC-SCNC: 12.4 MEQ/L (ref 5–15)
AST SERPL QL: 36 U/L (ref 14–36)
BILIRUBIN,TOTAL: 0.3 MG/DL (ref 0.2–1.3)
BUN SERPL-MCNC: 20 MG/DL (ref 7–17)
CALCIUM SPEC-MCNC: 9.5 MG/DL (ref 8.4–10.2)
CHLORIDE SPEC-SCNC: 106 MMOL/L (ref 98–107)
CHOLEST SPEC-SCNC: 151 MG/DL (ref 140–200)
CO2 SERPL-SCNC: 27 MMOL/L (ref 22–30)
CREAT BLD-SCNC: 1 MG/DL (ref 0.52–1.04)
ESTIMATED GLOMERULAR FILT RATE: 53 ML/MIN (ref 60–?)
GFR (AFRICAN AMERICAN): 64 ML/MIN (ref 60–?)
GLOBULIN SER CALC-MCNC: 2.6 G/DL (ref 1.3–3.2)
GLUCOSE: 119 MG/DL (ref 74–100)
HBA1C MFR BLD: 6.3 % (ref 4–6)
HCT VFR BLD CALC: 37.7 % (ref 37–47)
HDLC SERPL-MCNC: 68 MG/DL (ref 40–60)
HGB BLD-MCNC: 12.2 G/DL (ref 12.2–16.2)
MCHC RBC-ENTMCNC: 32.4 G/DL (ref 31.8–35.4)
MCV RBC: 90.1 FL (ref 81–99)
MEAN CORPUSCULAR HEMOGLOBIN: 29.1 PG (ref 27–31.2)
PLATELET # BLD: 243 K/MM3 (ref 142–424)
POTASSIUM: 4.4 MMOL/L (ref 3.5–5.1)
PROT SERPL-MCNC: 6.4 G/DL (ref 6.3–8.2)
RBC # BLD AUTO: 4.19 M/MM3 (ref 4.2–5.4)
SODIUM SPEC-SCNC: 141 MMOL/L (ref 136–145)
TRIGLYCERIDES: 148 MG/DL (ref 30–150)
WBC # BLD AUTO: 5.7 K/MM3 (ref 4.8–10.8)

## 2023-06-05 PROCEDURE — 80053 COMPREHEN METABOLIC PANEL: CPT

## 2023-06-05 PROCEDURE — 36415 COLL VENOUS BLD VENIPUNCTURE: CPT

## 2023-06-05 PROCEDURE — 80061 LIPID PANEL: CPT

## 2023-06-05 PROCEDURE — 85025 COMPLETE CBC W/AUTO DIFF WBC: CPT

## 2023-06-05 PROCEDURE — 83036 HEMOGLOBIN GLYCOSYLATED A1C: CPT

## 2023-06-06 ENCOUNTER — HOSPITAL ENCOUNTER (OUTPATIENT)
Age: 83
End: 2023-06-06
Payer: MEDICARE

## 2023-06-06 DIAGNOSIS — B96.1: ICD-10-CM

## 2023-06-06 DIAGNOSIS — N39.0: ICD-10-CM

## 2023-06-06 DIAGNOSIS — E11.9: Primary | ICD-10-CM

## 2023-06-06 PROCEDURE — 82043 UR ALBUMIN QUANTITATIVE: CPT

## 2023-06-06 PROCEDURE — 82570 ASSAY OF URINE CREATININE: CPT

## 2023-06-07 ENCOUNTER — HOSPITAL ENCOUNTER (OUTPATIENT)
Age: 83
End: 2023-06-07
Payer: MEDICARE

## 2023-06-07 DIAGNOSIS — N39.0: Primary | ICD-10-CM

## 2023-06-07 DIAGNOSIS — B96.1: ICD-10-CM

## 2023-06-07 PROCEDURE — 87086 URINE CULTURE/COLONY COUNT: CPT

## 2023-06-07 PROCEDURE — 87088 URINE BACTERIA CULTURE: CPT

## 2023-06-07 PROCEDURE — 87186 SC STD MICRODIL/AGAR DIL: CPT

## 2023-08-25 ENCOUNTER — HOSPITAL ENCOUNTER (OUTPATIENT)
Age: 83
End: 2023-08-25
Payer: MEDICARE

## 2023-08-25 DIAGNOSIS — M79.672: Primary | ICD-10-CM

## 2023-08-25 LAB
HCT VFR BLD CALC: 40.4 % (ref 37–47)
HGB BLD-MCNC: 13.3 G/DL (ref 12.2–16.2)
MCHC RBC-ENTMCNC: 32.8 G/DL (ref 31.8–35.4)
MCV RBC: 90.2 FL (ref 81–99)
MEAN CORPUSCULAR HEMOGLOBIN: 29.6 PG (ref 27–31.2)
PLATELET # BLD: 245 K/MM3 (ref 142–424)
RBC # BLD AUTO: 4.48 M/MM3 (ref 4.2–5.4)
URATE SERPL-SCNC: 6.3 MG/DL (ref 2.5–6.2)
WBC # BLD AUTO: 7.4 K/MM3 (ref 4.8–10.8)

## 2023-08-25 PROCEDURE — 85651 RBC SED RATE NONAUTOMATED: CPT

## 2023-08-25 PROCEDURE — 36415 COLL VENOUS BLD VENIPUNCTURE: CPT

## 2023-08-25 PROCEDURE — 73630 X-RAY EXAM OF FOOT: CPT

## 2023-08-25 PROCEDURE — 73610 X-RAY EXAM OF ANKLE: CPT

## 2023-08-25 PROCEDURE — 85025 COMPLETE CBC W/AUTO DIFF WBC: CPT

## 2023-08-25 PROCEDURE — 84550 ASSAY OF BLOOD/URIC ACID: CPT

## 2023-11-28 LAB
ALBUMIN LEVEL: 3.8 G/DL (ref 3.5–5)
ALBUMIN/GLOB SERPL: 1.5 {RATIO} (ref 1.1–1.8)
ALP ISO SERPL-ACNC: 106 U/L (ref 38–126)
ALT SERPLBLD-CCNC: 27 U/L (ref 12–78)
ANION GAP SERPL CALC-SCNC: 11.9 MEQ/L (ref 5–15)
AST SERPL QL: 39 U/L (ref 14–36)
BILIRUBIN,TOTAL: 0.4 MG/DL (ref 0.2–1.3)
BUN SERPL-MCNC: 14 MG/DL (ref 7–17)
CALCIUM SPEC-MCNC: 8.9 MG/DL (ref 8.4–10.2)
CHLORIDE SPEC-SCNC: 106 MMOL/L (ref 98–107)
CO2 SERPL-SCNC: 26 MMOL/L (ref 22–30)
CREAT BLD-SCNC: 0.9 MG/DL (ref 0.52–1.04)
ESTIMATED GLOMERULAR FILT RATE: 60 ML/MIN (ref 60–?)
GFR (AFRICAN AMERICAN): 72 ML/MIN (ref 60–?)
GLOBULIN SER CALC-MCNC: 2.6 G/DL (ref 1.3–3.2)
GLUCOSE: 116 MG/DL (ref 74–100)
HCT VFR BLD CALC: 35.3 % (ref 37–47)
HGB BLD-MCNC: 11.8 G/DL (ref 12.2–16.2)
MCHC RBC-ENTMCNC: 33.5 G/DL (ref 31.8–35.4)
MCV RBC: 94.7 FL (ref 81–99)
MEAN CORPUSCULAR HEMOGLOBIN: 31.7 PG (ref 27–31.2)
PLATELET # BLD: 155 K/MM3 (ref 142–424)
POTASSIUM: 3.9 MMOL/L (ref 3.5–5.1)
PROT SERPL-MCNC: 6.4 G/DL (ref 6.3–8.2)
RBC # BLD AUTO: 3.73 M/MM3 (ref 4.2–5.4)
SODIUM SPEC-SCNC: 140 MMOL/L (ref 136–145)
T4 FREE SERPL-MCNC: 1.16 NG/DL (ref 0.78–2.19)
TSH SERPL-ACNC: 0.57 UIU/ML (ref 0.47–4.68)
VITAMIN B12: 308 PG/ML (ref 239–931)
WBC # BLD AUTO: 2.7 K/MM3 (ref 4.8–10.8)

## 2023-11-29 ENCOUNTER — HOSPITAL ENCOUNTER (OUTPATIENT)
Age: 83
End: 2023-11-29
Payer: MEDICARE

## 2023-11-29 DIAGNOSIS — N39.0: Primary | ICD-10-CM

## 2023-11-29 DIAGNOSIS — R53.1: ICD-10-CM

## 2023-11-29 DIAGNOSIS — R42: ICD-10-CM

## 2023-11-29 DIAGNOSIS — Z79.899: ICD-10-CM

## 2023-11-29 DIAGNOSIS — R31.9: ICD-10-CM

## 2023-11-29 PROCEDURE — 87086 URINE CULTURE/COLONY COUNT: CPT

## 2023-11-29 PROCEDURE — 82607 VITAMIN B-12: CPT

## 2023-11-29 PROCEDURE — 84439 ASSAY OF FREE THYROXINE: CPT

## 2023-11-29 PROCEDURE — 84443 ASSAY THYROID STIM HORMONE: CPT

## 2023-11-29 PROCEDURE — 85025 COMPLETE CBC W/AUTO DIFF WBC: CPT

## 2023-11-29 PROCEDURE — 80053 COMPREHEN METABOLIC PANEL: CPT

## 2024-01-18 ENCOUNTER — HOSPITAL ENCOUNTER (OUTPATIENT)
Dept: HOSPITAL 22 - LAB.DROPOF | Age: 84
End: 2024-01-18
Payer: MEDICARE

## 2024-01-18 DIAGNOSIS — R30.0: Primary | ICD-10-CM

## 2024-01-18 PROCEDURE — 87086 URINE CULTURE/COLONY COUNT: CPT

## 2024-05-29 ENCOUNTER — HOSPITAL ENCOUNTER (OUTPATIENT)
Dept: HOSPITAL 22 - LAB.DROPOF | Age: 84
Discharge: HOME | End: 2024-05-29
Payer: MEDICARE

## 2024-05-29 DIAGNOSIS — R53.83: Primary | ICD-10-CM

## 2024-05-29 DIAGNOSIS — K52.9: ICD-10-CM

## 2024-05-29 DIAGNOSIS — Z87.440: ICD-10-CM

## 2024-05-29 DIAGNOSIS — E61.1: ICD-10-CM

## 2024-05-29 DIAGNOSIS — E11.9: ICD-10-CM

## 2024-05-29 DIAGNOSIS — E55.9: ICD-10-CM

## 2024-05-29 DIAGNOSIS — B96.1: ICD-10-CM

## 2024-05-29 LAB
25-OH VITAMIN D, TOTAL: 19.4 NG/ML (ref 30–100)
ALBUMIN LEVEL: 4.8 G/DL (ref 3.5–5)
ALBUMIN/GLOB SERPL: 1.6 {RATIO} (ref 1.1–1.8)
ALP ISO SERPL-ACNC: 145 U/L (ref 38–126)
ALT SERPLBLD-CCNC: 34 U/L (ref 12–78)
ANION GAP SERPL CALC-SCNC: 13.6 MEQ/L (ref 5–15)
AST SERPL QL: 39 U/L (ref 14–36)
BILIRUBIN,TOTAL: 0.6 MG/DL (ref 0.2–1.3)
BUN SERPL-MCNC: 20 MG/DL (ref 7–17)
CALCIUM SPEC-MCNC: 10.6 MG/DL (ref 8.4–10.2)
CHLORIDE SPEC-SCNC: 106 MMOL/L (ref 98–107)
CHOLEST SPEC-SCNC: 186 MG/DL (ref 140–200)
CO2 SERPL-SCNC: 28 MMOL/L (ref 22–30)
COLOR UR: YELLOW
CREATININE,SERUM: 1.1 MG/DL (ref 0.52–1.04)
ESTIMATED GLOMERULAR FILT RATE: 47 ML/MIN (ref 60–?)
FERRITIN SERPL-MCNC: 9.01 NG/ML (ref 11.1–264)
GFR (AFRICAN AMERICAN): 57 ML/MIN (ref 60–?)
GLOBULIN SER CALC-MCNC: 3 G/DL (ref 1.3–3.2)
GLUCOSE: 111 MG/DL (ref 74–100)
HBA1C MFR BLD: 5.8 % (ref 4–6)
HCT VFR BLD CALC: 40 % (ref 37–47)
HDLC SERPL-MCNC: 109 MG/DL (ref 40–60)
HGB BLD-MCNC: 13 G/DL (ref 12.2–16.2)
IRON SERPL QL: 76 UG/DL (ref 37–170)
MCHC RBC-ENTMCNC: 32.5 G/DL (ref 31.8–35.4)
MCV RBC: 97.1 FL (ref 81–99)
MEAN CORPUSCULAR HEMOGLOBIN: 31.5 PG (ref 27–31.2)
MICRO URNS: (no result)
PH UR: 5.5 [PH] (ref 5–8.5)
PLATELET # BLD: 288 K/MM3 (ref 142–424)
POTASSIUM: 4.6 MMOL/L (ref 3.5–5.1)
PROT SERPL-MCNC: 7.8 G/DL (ref 6.3–8.2)
RBC # BLD AUTO: 4.12 M/MM3 (ref 4.2–5.4)
SODIUM SPEC-SCNC: 143 MMOL/L (ref 136–145)
SP GR UR: 1.01 (ref 1–1.03)
T4 FREE SERPL-MCNC: 1.04 NG/DL (ref 0.78–2.19)
TOTAL IRON BINDING CAPACITY: 380 UG/DL (ref 265–497)
TRIGLYCERIDES: 146 MG/DL (ref 30–150)
TSH SERPL-ACNC: < 0.02 UIU/ML (ref 0.47–4.68)
UROBILINOGEN UR QL: 0.2 EU/DL
VITAMIN B12: 319 PG/ML (ref 239–931)
WBC # BLD AUTO: 5.4 K/MM3 (ref 4.8–10.8)

## 2024-05-29 PROCEDURE — 80053 COMPREHEN METABOLIC PANEL: CPT

## 2024-05-29 PROCEDURE — 82607 VITAMIN B-12: CPT

## 2024-05-29 PROCEDURE — 81001 URINALYSIS AUTO W/SCOPE: CPT

## 2024-05-29 PROCEDURE — 84439 ASSAY OF FREE THYROXINE: CPT

## 2024-05-29 PROCEDURE — 87086 URINE CULTURE/COLONY COUNT: CPT

## 2024-05-29 PROCEDURE — 83036 HEMOGLOBIN GLYCOSYLATED A1C: CPT

## 2024-05-29 PROCEDURE — 84443 ASSAY THYROID STIM HORMONE: CPT

## 2024-05-29 PROCEDURE — 87186 SC STD MICRODIL/AGAR DIL: CPT

## 2024-05-29 PROCEDURE — 80061 LIPID PANEL: CPT

## 2024-05-29 PROCEDURE — 83540 ASSAY OF IRON: CPT

## 2024-05-29 PROCEDURE — 82306 VITAMIN D 25 HYDROXY: CPT

## 2024-05-29 PROCEDURE — 85025 COMPLETE CBC W/AUTO DIFF WBC: CPT

## 2024-05-29 PROCEDURE — 83550 IRON BINDING TEST: CPT

## 2024-05-29 PROCEDURE — 87088 URINE BACTERIA CULTURE: CPT

## 2024-05-29 PROCEDURE — 82728 ASSAY OF FERRITIN: CPT

## 2024-06-05 ENCOUNTER — HOSPITAL ENCOUNTER (OUTPATIENT)
Dept: HOSPITAL 22 - LAB.DROPOF | Age: 84
Discharge: HOME | End: 2024-06-05
Payer: MEDICARE

## 2024-06-05 DIAGNOSIS — R53.83: ICD-10-CM

## 2024-06-05 DIAGNOSIS — K92.1: ICD-10-CM

## 2024-06-05 DIAGNOSIS — R19.7: Primary | ICD-10-CM

## 2024-06-05 PROCEDURE — G0328 FECAL BLOOD SCRN IMMUNOASSAY: HCPCS

## 2024-06-05 PROCEDURE — 87506 IADNA-DNA/RNA PROBE TQ 6-11: CPT

## 2024-06-05 PROCEDURE — 87177 OVA AND PARASITES SMEARS: CPT

## 2024-06-05 PROCEDURE — 82272 OCCULT BLD FECES 1-3 TESTS: CPT

## 2024-06-05 PROCEDURE — 87045 FECES CULTURE AEROBIC BACT: CPT

## 2024-06-11 ENCOUNTER — HOSPITAL ENCOUNTER (OUTPATIENT)
Dept: HOSPITAL 22 - INF | Age: 84
Discharge: HOME | End: 2024-06-11
Payer: MEDICARE

## 2024-06-11 VITALS
RESPIRATION RATE: 18 BRPM | HEART RATE: 72 BPM | DIASTOLIC BLOOD PRESSURE: 71 MMHG | OXYGEN SATURATION: 98 % | SYSTOLIC BLOOD PRESSURE: 148 MMHG | TEMPERATURE: 98.1 F

## 2024-06-11 VITALS — DIASTOLIC BLOOD PRESSURE: 76 MMHG | HEART RATE: 76 BPM | SYSTOLIC BLOOD PRESSURE: 134 MMHG

## 2024-06-11 DIAGNOSIS — E61.1: Primary | ICD-10-CM

## 2024-06-11 PROCEDURE — 96365 THER/PROPH/DIAG IV INF INIT: CPT

## 2024-06-11 RX ADMIN — Medication 10 ML: at 13:15

## 2024-06-11 RX ADMIN — IRON SUCROSE 220 MG: 20 INJECTION, SOLUTION INTRAVENOUS at 13:15

## 2024-06-20 ENCOUNTER — HOSPITAL ENCOUNTER (OUTPATIENT)
Dept: HOSPITAL 22 - INF | Age: 84
Discharge: HOME | End: 2024-06-20
Payer: MEDICARE

## 2024-06-20 VITALS
DIASTOLIC BLOOD PRESSURE: 77 MMHG | HEART RATE: 77 BPM | OXYGEN SATURATION: 98 % | RESPIRATION RATE: 20 BRPM | SYSTOLIC BLOOD PRESSURE: 133 MMHG

## 2024-06-20 VITALS
RESPIRATION RATE: 20 BRPM | DIASTOLIC BLOOD PRESSURE: 71 MMHG | SYSTOLIC BLOOD PRESSURE: 141 MMHG | OXYGEN SATURATION: 98 % | TEMPERATURE: 97.6 F | HEART RATE: 81 BPM

## 2024-06-20 DIAGNOSIS — D50.9: Primary | ICD-10-CM

## 2024-06-20 PROCEDURE — 96365 THER/PROPH/DIAG IV INF INIT: CPT

## 2024-06-20 RX ADMIN — Medication 10 ML: at 12:58

## 2024-06-20 RX ADMIN — IRON SUCROSE 220 MG: 20 INJECTION, SOLUTION INTRAVENOUS at 12:58

## 2024-06-27 ENCOUNTER — HOSPITAL ENCOUNTER (OUTPATIENT)
Dept: HOSPITAL 22 - INF | Age: 84
Discharge: HOME | End: 2024-06-27
Payer: MEDICARE

## 2024-06-27 VITALS
HEART RATE: 78 BPM | DIASTOLIC BLOOD PRESSURE: 82 MMHG | SYSTOLIC BLOOD PRESSURE: 139 MMHG | RESPIRATION RATE: 18 BRPM | OXYGEN SATURATION: 99 %

## 2024-06-27 VITALS
DIASTOLIC BLOOD PRESSURE: 86 MMHG | SYSTOLIC BLOOD PRESSURE: 144 MMHG | HEART RATE: 74 BPM | TEMPERATURE: 98.5 F | RESPIRATION RATE: 18 BRPM | OXYGEN SATURATION: 98 %

## 2024-06-27 DIAGNOSIS — D50.9: Primary | ICD-10-CM

## 2024-06-27 PROCEDURE — 96365 THER/PROPH/DIAG IV INF INIT: CPT

## 2024-06-27 RX ADMIN — Medication 10 ML: at 13:30

## 2024-06-27 RX ADMIN — IRON SUCROSE 220 MG: 20 INJECTION, SOLUTION INTRAVENOUS at 12:45

## 2024-07-03 ENCOUNTER — HOSPITAL ENCOUNTER (OUTPATIENT)
Dept: HOSPITAL 22 - INF | Age: 84
Discharge: HOME | End: 2024-07-03
Payer: MEDICARE

## 2024-07-03 VITALS
OXYGEN SATURATION: 98 % | DIASTOLIC BLOOD PRESSURE: 72 MMHG | HEART RATE: 73 BPM | TEMPERATURE: 97.6 F | SYSTOLIC BLOOD PRESSURE: 155 MMHG | RESPIRATION RATE: 18 BRPM

## 2024-07-03 VITALS
HEART RATE: 64 BPM | SYSTOLIC BLOOD PRESSURE: 144 MMHG | RESPIRATION RATE: 18 BRPM | DIASTOLIC BLOOD PRESSURE: 69 MMHG | OXYGEN SATURATION: 98 %

## 2024-07-03 DIAGNOSIS — D50.9: Primary | ICD-10-CM

## 2024-07-03 PROCEDURE — 96365 THER/PROPH/DIAG IV INF INIT: CPT

## 2024-07-03 RX ADMIN — IRON SUCROSE 220 MG: 20 INJECTION, SOLUTION INTRAVENOUS at 13:04

## 2024-07-03 RX ADMIN — Medication 10 ML: at 13:04

## 2024-07-10 ENCOUNTER — HOSPITAL ENCOUNTER (OUTPATIENT)
Dept: HOSPITAL 22 - INF | Age: 84
Discharge: HOME | End: 2024-07-10
Payer: MEDICARE

## 2024-07-10 VITALS
TEMPERATURE: 98.06 F | SYSTOLIC BLOOD PRESSURE: 138 MMHG | DIASTOLIC BLOOD PRESSURE: 53 MMHG | HEART RATE: 71 BPM | RESPIRATION RATE: 18 BRPM | OXYGEN SATURATION: 98 %

## 2024-07-10 VITALS
SYSTOLIC BLOOD PRESSURE: 120 MMHG | DIASTOLIC BLOOD PRESSURE: 91 MMHG | OXYGEN SATURATION: 97 % | RESPIRATION RATE: 18 BRPM | HEART RATE: 68 BPM

## 2024-07-10 DIAGNOSIS — E61.1: Primary | ICD-10-CM

## 2024-07-10 PROCEDURE — 96365 THER/PROPH/DIAG IV INF INIT: CPT

## 2024-07-10 RX ADMIN — Medication 10 ML: at 13:02

## 2024-07-10 RX ADMIN — IRON SUCROSE 220 MG: 20 INJECTION, SOLUTION INTRAVENOUS at 13:02

## 2024-09-09 ENCOUNTER — HOSPITAL ENCOUNTER (EMERGENCY)
Age: 84
Discharge: HOME | End: 2024-09-09
Payer: MEDICARE

## 2024-09-09 VITALS
HEART RATE: 61 BPM | SYSTOLIC BLOOD PRESSURE: 186 MMHG | OXYGEN SATURATION: 97 % | RESPIRATION RATE: 26 BRPM | DIASTOLIC BLOOD PRESSURE: 81 MMHG

## 2024-09-09 VITALS
SYSTOLIC BLOOD PRESSURE: 187 MMHG | TEMPERATURE: 97.6 F | OXYGEN SATURATION: 100 % | BODY MASS INDEX: 22.4 KG/M2 | DIASTOLIC BLOOD PRESSURE: 166 MMHG | RESPIRATION RATE: 18 BRPM | HEART RATE: 64 BPM

## 2024-09-09 VITALS
DIASTOLIC BLOOD PRESSURE: 77 MMHG | RESPIRATION RATE: 25 BRPM | OXYGEN SATURATION: 100 % | SYSTOLIC BLOOD PRESSURE: 179 MMHG | HEART RATE: 60 BPM

## 2024-09-09 VITALS
RESPIRATION RATE: 23 BRPM | OXYGEN SATURATION: 99 % | SYSTOLIC BLOOD PRESSURE: 147 MMHG | HEART RATE: 63 BPM | DIASTOLIC BLOOD PRESSURE: 111 MMHG

## 2024-09-09 VITALS
RESPIRATION RATE: 16 BRPM | TEMPERATURE: 98 F | HEART RATE: 64 BPM | DIASTOLIC BLOOD PRESSURE: 78 MMHG | OXYGEN SATURATION: 98 % | SYSTOLIC BLOOD PRESSURE: 185 MMHG

## 2024-09-09 VITALS
SYSTOLIC BLOOD PRESSURE: 199 MMHG | RESPIRATION RATE: 18 BRPM | DIASTOLIC BLOOD PRESSURE: 116 MMHG | OXYGEN SATURATION: 100 % | HEART RATE: 69 BPM

## 2024-09-09 VITALS — HEART RATE: 58 BPM

## 2024-09-09 DIAGNOSIS — R06.02: ICD-10-CM

## 2024-09-09 DIAGNOSIS — R53.1: ICD-10-CM

## 2024-09-09 DIAGNOSIS — R74.02: ICD-10-CM

## 2024-09-09 DIAGNOSIS — U07.1: Primary | ICD-10-CM

## 2024-09-09 LAB
ALBUMIN LEVEL: 4.3 G/DL (ref 3.5–5)
ALBUMIN/GLOB SERPL: 1.3 {RATIO} (ref 1.1–1.8)
ALP ISO SERPL-ACNC: 110 U/L (ref 38–126)
ALT SERPLBLD-CCNC: 36 U/L (ref 12–78)
ANION GAP SERPL CALC-SCNC: 7.6 MEQ/L (ref 5–15)
AST SERPL QL: 39 U/L (ref 14–36)
BILIRUBIN,TOTAL: 0.8 MG/DL (ref 0.2–1.3)
BUN SERPL-MCNC: 19 MG/DL (ref 7–17)
CALCIUM SPEC-MCNC: 10.2 MG/DL (ref 8.4–10.2)
CHLORIDE SPEC-SCNC: 108 MMOL/L (ref 98–107)
CO2 SERPL-SCNC: 29 MMOL/L (ref 22–30)
COLOR UR: YELLOW
CREAT BLD-SCNC: 0.9 MG/DL (ref 0.52–1.04)
CREATININE CLEARANCE ESTIMATED: 40 ML/MIN (ref 50–200)
D-DIMER: 0.39 UG/ML (ref 0–0.5)
ESTIMATED GLOMERULAR FILT RATE: 60 ML/MIN (ref 60–?)
GFR (AFRICAN AMERICAN): 72 ML/MIN (ref 60–?)
GLOBULIN SER CALC-MCNC: 3.2 G/DL (ref 1.3–3.2)
GLUCOSE: 112 MG/DL (ref 74–100)
HCO3 BLDV-SCNC: 26.2 MMOL/L (ref 23–30)
HCT VFR BLD CALC: 41.5 % (ref 37–47)
HGB BLD-MCNC: 13.2 G/DL (ref 12.2–16.2)
LACTATE VENOUS: 2.5 MMOL/L (ref 0.4–2)
MCHC RBC-ENTMCNC: 31.7 G/DL (ref 31.8–35.4)
MCV RBC: 99.9 FL (ref 81–99)
MEAN CORPUSCULAR HEMOGLOBIN: 31.7 PG (ref 27–31.2)
MICRO URNS: (no result)
NT PRO BRAIN NATRIURETIC PEP.: 669 PG/ML (ref 0–450)
PCO2 BLDV: 34.4 MMOL/L (ref 35–51)
PH BLDV: 7.5 MMOL/L (ref 7.31–7.41)
PH UR: 8 [PH] (ref 5–8.5)
PLATELET # BLD: 280 K/MM3 (ref 142–424)
PO2 BLDV: 26.7 MMOL/L (ref 28–40)
POTASSIUM: 3.6 MMOL/L (ref 3.5–5.1)
PROT SERPL-MCNC: 7.5 G/DL (ref 6.3–8.2)
RBC # BLD AUTO: 4.16 M/MM3 (ref 4.2–5.4)
REFLEX LACTIC: (no result)
SODIUM SPEC-SCNC: 141 MMOL/L (ref 136–145)
SP GR UR: 1.02 (ref 1–1.03)
TROPONIN I: < 0.01 NG/ML (ref 0–0.03)
UROBILINOGEN UR QL: 0.2 EU/DL
WBC # BLD AUTO: 5.7 K/MM3 (ref 4.8–10.8)

## 2024-09-09 PROCEDURE — 99285 EMERGENCY DEPT VISIT HI MDM: CPT

## 2024-09-09 PROCEDURE — 83880 ASSAY OF NATRIURETIC PEPTIDE: CPT

## 2024-09-09 PROCEDURE — 85025 COMPLETE CBC W/AUTO DIFF WBC: CPT

## 2024-09-09 PROCEDURE — 84484 ASSAY OF TROPONIN QUANT: CPT

## 2024-09-09 PROCEDURE — 93005 ELECTROCARDIOGRAM TRACING: CPT

## 2024-09-09 PROCEDURE — 81001 URINALYSIS AUTO W/SCOPE: CPT

## 2024-09-09 PROCEDURE — 87636 SARSCOV2 & INF A&B AMP PRB: CPT

## 2024-09-09 PROCEDURE — 71045 X-RAY EXAM CHEST 1 VIEW: CPT

## 2024-09-09 PROCEDURE — 82803 BLOOD GASES ANY COMBINATION: CPT

## 2024-09-09 PROCEDURE — 80053 COMPREHEN METABOLIC PANEL: CPT

## 2024-09-09 PROCEDURE — 85378 FIBRIN DEGRADE SEMIQUANT: CPT

## 2024-10-04 ENCOUNTER — HOSPITAL ENCOUNTER (OUTPATIENT)
Age: 84
Discharge: HOME | End: 2024-10-04
Payer: MEDICARE

## 2024-10-04 VITALS
OXYGEN SATURATION: 99 % | RESPIRATION RATE: 20 BRPM | HEART RATE: 80 BPM | DIASTOLIC BLOOD PRESSURE: 106 MMHG | SYSTOLIC BLOOD PRESSURE: 146 MMHG

## 2024-10-04 VITALS
HEART RATE: 60 BPM | OXYGEN SATURATION: 100 % | RESPIRATION RATE: 20 BRPM | DIASTOLIC BLOOD PRESSURE: 100 MMHG | SYSTOLIC BLOOD PRESSURE: 146 MMHG

## 2024-10-04 VITALS
OXYGEN SATURATION: 97 % | HEART RATE: 60 BPM | SYSTOLIC BLOOD PRESSURE: 133 MMHG | RESPIRATION RATE: 20 BRPM | DIASTOLIC BLOOD PRESSURE: 97 MMHG

## 2024-10-04 VITALS — BODY MASS INDEX: 22.7 KG/M2

## 2024-10-04 DIAGNOSIS — Z79.899: ICD-10-CM

## 2024-10-04 DIAGNOSIS — Z45.09: Primary | ICD-10-CM

## 2024-10-04 PROCEDURE — 33286 RMVL SUBQ CAR RHYTHM MNTR: CPT

## 2024-10-10 ENCOUNTER — HOSPITAL ENCOUNTER (OUTPATIENT)
Dept: HOSPITAL 22 - RAD | Age: 84
Discharge: HOME | End: 2024-10-10
Payer: MEDICARE

## 2024-10-10 DIAGNOSIS — M25.511: Primary | ICD-10-CM

## 2024-10-10 PROCEDURE — 73221 MRI JOINT UPR EXTREM W/O DYE: CPT

## 2024-12-30 ENCOUNTER — HOSPITAL ENCOUNTER (OUTPATIENT)
Dept: HOSPITAL 22 - OT | Age: 84
Discharge: HOME | End: 2024-12-30
Payer: MEDICARE

## 2024-12-30 DIAGNOSIS — M25.511: Primary | ICD-10-CM

## 2024-12-30 DIAGNOSIS — Z98.890: ICD-10-CM

## 2024-12-30 PROCEDURE — 97014 ELECTRIC STIMULATION THERAPY: CPT

## 2024-12-30 PROCEDURE — 97530 THERAPEUTIC ACTIVITIES: CPT

## 2024-12-30 PROCEDURE — 97035 APP MDLTY 1+ULTRASOUND EA 15: CPT

## 2024-12-30 PROCEDURE — 97140 MANUAL THERAPY 1/> REGIONS: CPT

## 2024-12-30 PROCEDURE — 97165 OT EVAL LOW COMPLEX 30 MIN: CPT

## 2024-12-30 PROCEDURE — 97168 OT RE-EVAL EST PLAN CARE: CPT

## 2024-12-30 PROCEDURE — G0283 ELEC STIM OTHER THAN WOUND: HCPCS
